# Patient Record
Sex: MALE | URBAN - METROPOLITAN AREA
[De-identification: names, ages, dates, MRNs, and addresses within clinical notes are randomized per-mention and may not be internally consistent; named-entity substitution may affect disease eponyms.]

---

## 2023-10-27 PROBLEM — D72.10 EOSINOPHILIA: Status: ACTIVE | Noted: 2022-03-23

## 2023-10-27 PROBLEM — R74.8 ELEVATED LIVER ENZYMES: Status: ACTIVE | Noted: 2022-03-23

## 2023-10-27 PROBLEM — R76.11 POSITIVE TB TEST: Status: ACTIVE | Noted: 2022-03-22

## 2023-10-28 PROBLEM — R76.11 POSITIVE TB TEST: Status: RESOLVED | Noted: 2022-03-22 | Resolved: 2023-10-28

## 2023-10-28 PROBLEM — Z86.11 HISTORY OF TB (TUBERCULOSIS): Status: ACTIVE | Noted: 2023-10-28
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
## 2023-10-31 ENCOUNTER — CLINICAL SUPPORT (OUTPATIENT)
Dept: AUDIOLOGY | Facility: CLINIC | Age: 38
End: 2023-10-31
Payer: MEDICAID

## 2023-10-31 ENCOUNTER — OFFICE VISIT (OUTPATIENT)
Dept: OTOLARYNGOLOGY | Facility: CLINIC | Age: 38
End: 2023-10-31
Payer: MEDICAID

## 2023-10-31 DIAGNOSIS — H72.01 CENTRAL PERFORATION OF TYMPANIC MEMBRANE OF RIGHT EAR: ICD-10-CM

## 2023-10-31 DIAGNOSIS — H91.91 PROFOUND HEARING LOSS OF RIGHT EAR: ICD-10-CM

## 2023-10-31 DIAGNOSIS — H92.11 OTORRHEA OF RIGHT EAR: ICD-10-CM

## 2023-10-31 DIAGNOSIS — Z86.11 HISTORY OF TB (TUBERCULOSIS): Primary | ICD-10-CM

## 2023-10-31 DIAGNOSIS — H90.A22 SENSORINEURAL HEARING LOSS (SNHL) OF LEFT EAR WITH RESTRICTED HEARING OF RIGHT EAR: ICD-10-CM

## 2023-10-31 DIAGNOSIS — H90.3 ASYMMETRIC SNHL (SENSORINEURAL HEARING LOSS): Primary | ICD-10-CM

## 2023-10-31 DIAGNOSIS — H92.01 RIGHT EAR PAIN: ICD-10-CM

## 2023-10-31 PROBLEM — M54.50 LOW BACK PAIN, UNSPECIFIED: Status: ACTIVE | Noted: 2023-08-30

## 2023-10-31 PROCEDURE — 99204 OFFICE O/P NEW MOD 45 MIN: CPT | Performed by: OTOLARYNGOLOGY

## 2023-10-31 PROCEDURE — 92553 AUDIOMETRY AIR & BONE: CPT | Performed by: AUDIOLOGIST

## 2023-10-31 PROCEDURE — 87070 CULTURE OTHR SPECIMN AEROBIC: CPT

## 2023-10-31 PROCEDURE — 87075 CULTR BACTERIA EXCEPT BLOOD: CPT

## 2023-10-31 RX ORDER — CIPROFLOXACIN AND DEXAMETHASONE 3; 1 MG/ML; MG/ML
SUSPENSION/ DROPS AURICULAR (OTIC)
Qty: 10 ML | Refills: 1 | Status: SHIPPED | OUTPATIENT
Start: 2023-10-31 | End: 2023-10-31

## 2023-10-31 RX ORDER — LOPERAMIDE HYDROCHLORIDE 2 MG/1
CAPSULE ORAL
COMMUNITY
Start: 2023-07-11

## 2023-10-31 RX ORDER — OFLOXACIN 3 MG/ML
SOLUTION AURICULAR (OTIC)
Qty: 10 ML | Refills: 0 | Status: SHIPPED | OUTPATIENT
Start: 2023-10-31

## 2023-10-31 RX ORDER — TOBRAMYCIN AND DEXAMETHASONE 3; 1 MG/ML; MG/ML
SUSPENSION/ DROPS OPHTHALMIC
COMMUNITY
Start: 2023-10-26
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
## 2023-11-03 LAB
BACTERIA SPEC CULT: ABNORMAL
GRAM STN SPEC: ABNORMAL
GRAM STN SPEC: ABNORMAL

## 2023-11-10 ENCOUNTER — ANCILLARY PROCEDURE (OUTPATIENT)
Dept: RADIOLOGY | Facility: CLINIC | Age: 38
End: 2023-11-10
Payer: MEDICAID

## 2023-11-10 DIAGNOSIS — H91.91 PROFOUND HEARING LOSS OF RIGHT EAR: ICD-10-CM

## 2023-11-10 DIAGNOSIS — H72.01 CENTRAL PERFORATION OF TYMPANIC MEMBRANE OF RIGHT EAR: ICD-10-CM

## 2023-11-10 PROCEDURE — 70480 CT ORBIT/EAR/FOSSA W/O DYE: CPT | Performed by: RADIOLOGY

## 2023-11-10 PROCEDURE — 70480 CT ORBIT/EAR/FOSSA W/O DYE: CPT

## 2023-11-11 NOTE — RESULT ENCOUNTER NOTE
Please call the patient's representative to let them know that there were no complications from his ear findings on the CT scan.  Scattered areas of irritation to mucous membranes are noted along with the perforation on his right ear and the results and images of the scan will be available to the otology consultant he is to see.  To this point there were no findings on cultures regarding infection.

## 2024-02-05 ENCOUNTER — OFFICE VISIT (OUTPATIENT)
Dept: OTOLARYNGOLOGY | Facility: CLINIC | Age: 39
End: 2024-02-05
Payer: MEDICAID

## 2024-02-05 VITALS — WEIGHT: 154.32 LBS

## 2024-02-05 PROBLEM — Z86.11 HISTORY OF TUBERCULOSIS: Status: ACTIVE | Noted: 2023-10-28

## 2024-02-05 PROBLEM — M54.50 LOW BACK PAIN: Status: ACTIVE | Noted: 2023-08-30

## 2024-02-05 PROBLEM — H91.93 BILATERAL HEARING LOSS: Status: ACTIVE | Noted: 2024-02-05

## 2024-02-05 PROBLEM — H72.01 CENTRAL PERFORATION OF TYMPANIC MEMBRANE OF RIGHT EAR: Status: ACTIVE | Noted: 2024-02-05

## 2024-02-05 PROBLEM — H91.90 PROFOUND HEARING LOSS: Status: ACTIVE | Noted: 2024-02-05

## 2024-02-05 PROBLEM — H92.01 RIGHT EAR PAIN: Status: ACTIVE | Noted: 2024-02-05

## 2024-02-05 PROBLEM — H92.11 OTORRHEA OF RIGHT EAR: Status: ACTIVE | Noted: 2024-02-05

## 2024-02-05 RX ORDER — OMEPRAZOLE 40 MG/1
40 CAPSULE, DELAYED RELEASE ORAL
COMMUNITY
Start: 2024-01-05

## 2024-02-05 RX ORDER — FAMOTIDINE 20 MG/1
20 TABLET, FILM COATED ORAL DAILY
COMMUNITY
Start: 2024-01-05